# Patient Record
Sex: MALE | URBAN - METROPOLITAN AREA
[De-identification: names, ages, dates, MRNs, and addresses within clinical notes are randomized per-mention and may not be internally consistent; named-entity substitution may affect disease eponyms.]

---

## 2021-09-22 ENCOUNTER — OFFICE VISIT (OUTPATIENT)
Dept: URBAN - METROPOLITAN AREA CLINIC 64 | Facility: CLINIC | Age: 51
End: 2021-09-22
Payer: COMMERCIAL

## 2021-09-22 DIAGNOSIS — H52.13 MYOPIA, BILATERAL: Primary | ICD-10-CM

## 2021-09-22 PROCEDURE — 92004 COMPRE OPH EXAM NEW PT 1/>: CPT | Performed by: OPTOMETRIST

## 2021-09-22 ASSESSMENT — INTRAOCULAR PRESSURE
OD: 14
OS: 13

## 2021-09-22 ASSESSMENT — VISUAL ACUITY
OD: 20/20
OS: 20/20

## 2021-09-22 ASSESSMENT — KERATOMETRY
OD: 42.87
OS: 42.98

## 2021-09-22 NOTE — IMPRESSION/PLAN
Impression: Myopia, bilateral: H52.13. Plan: He tried DVO but was bothered by blurry near vision. He is interested in trying PALs. Rx given. RTC if trouble adjusting to PALs.